# Patient Record
Sex: MALE | NOT HISPANIC OR LATINO | Employment: OTHER
[De-identification: names, ages, dates, MRNs, and addresses within clinical notes are randomized per-mention and may not be internally consistent; named-entity substitution may affect disease eponyms.]

---

## 2022-02-04 ENCOUNTER — APPOINTMENT (OUTPATIENT)
Dept: INTERNAL MEDICINE | Facility: OTHER | Age: 22
End: 2022-02-04
Payer: COMMERCIAL

## 2022-02-07 ENCOUNTER — OFFICE VISIT (OUTPATIENT)
Dept: INTERNAL MEDICINE | Facility: OTHER | Age: 22
End: 2022-02-07
Payer: COMMERCIAL

## 2022-02-07 VITALS
HEART RATE: 51 BPM | SYSTOLIC BLOOD PRESSURE: 106 MMHG | OXYGEN SATURATION: 95 % | DIASTOLIC BLOOD PRESSURE: 61 MMHG | BODY MASS INDEX: 34.2 KG/M2 | TEMPERATURE: 97.6 F | WEIGHT: 212.8 LBS | HEIGHT: 66 IN

## 2022-02-07 DIAGNOSIS — E78.5 DYSLIPIDEMIA: ICD-10-CM

## 2022-02-07 DIAGNOSIS — G47.00 INSOMNIA, UNSPECIFIED TYPE: ICD-10-CM

## 2022-02-07 DIAGNOSIS — Z83.438 FAMILY HISTORY OF HYPERLIPIDEMIA: ICD-10-CM

## 2022-02-07 DIAGNOSIS — F41.8 SITUATIONAL ANXIETY: ICD-10-CM

## 2022-02-07 DIAGNOSIS — F41.9 ANXIETY: ICD-10-CM

## 2022-02-07 DIAGNOSIS — Z83.3 FAMILY HISTORY OF DIABETES MELLITUS: ICD-10-CM

## 2022-02-07 DIAGNOSIS — Z91.89 AT RISK FOR SEXUALLY TRANSMITTED DISEASE DUE TO UNPROTECTED SEX: ICD-10-CM

## 2022-02-07 DIAGNOSIS — E66.09 CLASS 1 OBESITY DUE TO EXCESS CALORIES WITHOUT SERIOUS COMORBIDITY WITH BODY MASS INDEX (BMI) OF 34.0 TO 34.9 IN ADULT: ICD-10-CM

## 2022-02-07 PROBLEM — M25.561 ARTHRALGIA OF RIGHT KNEE: Status: ACTIVE | Noted: 2019-10-21

## 2022-02-07 PROBLEM — J30.2 SEASONAL ALLERGIES: Status: ACTIVE | Noted: 2019-10-21

## 2022-02-07 PROCEDURE — 99204 OFFICE O/P NEW MOD 45 MIN: CPT | Performed by: INTERNAL MEDICINE

## 2022-02-07 ASSESSMENT — PATIENT HEALTH QUESTIONNAIRE - PHQ9: CLINICAL INTERPRETATION OF PHQ2 SCORE: 0
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
## 2022-02-07 NOTE — PROGRESS NOTES
Previously seen at [facility] family medicine clinic at [facility] .last visit was in 03/2021.

## 2022-02-07 NOTE — PATIENT INSTRUCTIONS
Follow up in 3 months  Referral to psychology   Sleep hygiene   Labs ordered, please get it before next visit   Please consider COVID and FLU vaccination

## 2022-02-08 PROBLEM — F41.8 SITUATIONAL ANXIETY: Status: ACTIVE | Noted: 2022-02-08

## 2022-02-08 PROBLEM — Z91.89 AT RISK FOR SEXUALLY TRANSMITTED DISEASE DUE TO UNPROTECTED SEX: Status: ACTIVE | Noted: 2022-02-08

## 2022-02-08 PROBLEM — G47.00 INSOMNIA: Status: ACTIVE | Noted: 2022-02-08

## 2022-02-08 PROBLEM — F41.9 ANXIETY: Status: ACTIVE | Noted: 2022-02-08

## 2022-02-08 PROBLEM — Z11.3 SCREEN FOR STD (SEXUALLY TRANSMITTED DISEASE): Status: ACTIVE | Noted: 2022-02-08

## 2022-02-08 ASSESSMENT — ENCOUNTER SYMPTOMS
NECK PAIN: 0
NAUSEA: 0
PALPITATIONS: 0
BLURRED VISION: 0
COUGH: 0
LOSS OF CONSCIOUSNESS: 0
BACK PAIN: 0
MYALGIAS: 0
WEIGHT LOSS: 0
DEPRESSION: 0
SHORTNESS OF BREATH: 0
SORE THROAT: 0
HALLUCINATIONS: 0
CHILLS: 0
SPUTUM PRODUCTION: 0
DIZZINESS: 0
VOMITING: 0
INSOMNIA: 1
FEVER: 0
ABDOMINAL PAIN: 0
WEAKNESS: 0
HEARTBURN: 0
HEADACHES: 0

## 2022-02-08 ASSESSMENT — LIFESTYLE VARIABLES: SUBSTANCE_ABUSE: 0

## 2022-02-08 NOTE — ASSESSMENT & PLAN NOTE
In relationship - female partner. Does not use condom for protection  Partner diagnosed with possible chlamydia  Plan:  Will get STD screening

## 2022-02-08 NOTE — ASSESSMENT & PLAN NOTE
BMI of 34.3 kg/m2  Started exercising recently and has been regularly but unhealthy diet   Plan:  counseling provided   Discussed all the future complications with obesity including HTN,diabetes,JOHANN,cardiac diseases  Discussed importance of healthy diet and exercise for weight loss  Goal of at least 5 % weight loss in 3 months  metabolic panel and other labs ordered

## 2022-02-08 NOTE — ASSESSMENT & PLAN NOTE
Insomnia present for a year   Most likely multifactorial including unhealthy sleep habits , stress ( to support family as the elder son) and concern for PTSD/anxiety as one of the etiology   Plan:  Counseling provided   Educated about sleep hygiene, relaxation techniques,meditation.  Referral to psychology

## 2022-02-08 NOTE — ASSESSMENT & PLAN NOTE
As per patient h/o dyslipidemia in past but not sure.  No labs with us. Family h/o high cholesterol and heart diseases  Plan:  Lipid panel   counseling provided for weight loss, healthy diet and exercise
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
## 2022-02-16 ENCOUNTER — APPOINTMENT (OUTPATIENT)
Dept: LAB | Facility: MEDICAL CENTER | Age: 22
End: 2022-02-16
Payer: COMMERCIAL

## 2022-02-28 DIAGNOSIS — Z83.438 FAMILY HISTORY OF HYPERLIPIDEMIA: ICD-10-CM

## 2022-02-28 DIAGNOSIS — Z83.3 FAMILY HISTORY OF DIABETES MELLITUS: ICD-10-CM

## 2022-02-28 DIAGNOSIS — Z91.89 AT RISK FOR SEXUALLY TRANSMITTED DISEASE DUE TO UNPROTECTED SEX: ICD-10-CM

## 2022-02-28 DIAGNOSIS — F41.9 ANXIETY: ICD-10-CM

## 2022-03-10 ENCOUNTER — TELEPHONE (OUTPATIENT)
Dept: INTERNAL MEDICINE | Facility: OTHER | Age: 22
End: 2022-03-10
Payer: COMMERCIAL

## 2022-03-10 NOTE — TELEPHONE ENCOUNTER
Pt requesting a call , had lab work done in Feb and still ha not heard anything , was told told by another provider not in this office that labs were in his chart but unable to give results

## 2022-03-10 NOTE — TELEPHONE ENCOUNTER
I called the patient and informed the lab results.      Total cholesterol 243  LDL - 174    HDL 33  A1c= 5.1  TSH- normal   Normal electrolytes   Normal renal and liver function    STD panel      HIV -neg  HEpB- negative   RPR- negative   GC-Chlamydia- negative     Patient reports aware that he has high cholesterol at age 12-13 years when he was overweight. Family history of diabetes hyperlipidemia and CAD present.    Discussed about low fat and low calorie diet   regular exercise for weight loss  5-10% weight loss in 3-6 months

## 2022-04-27 ENCOUNTER — OFFICE VISIT (OUTPATIENT)
Dept: INTERNAL MEDICINE | Facility: OTHER | Age: 22
End: 2022-04-27
Payer: COMMERCIAL

## 2022-04-27 VITALS
HEIGHT: 66 IN | HEART RATE: 64 BPM | SYSTOLIC BLOOD PRESSURE: 123 MMHG | DIASTOLIC BLOOD PRESSURE: 63 MMHG | OXYGEN SATURATION: 96 % | BODY MASS INDEX: 35.97 KG/M2 | WEIGHT: 223.8 LBS | TEMPERATURE: 98.3 F

## 2022-04-27 DIAGNOSIS — L98.9 SKIN LESION: ICD-10-CM

## 2022-04-27 PROCEDURE — 99213 OFFICE O/P EST LOW 20 MIN: CPT | Mod: GE | Performed by: STUDENT IN AN ORGANIZED HEALTH CARE EDUCATION/TRAINING PROGRAM

## 2022-04-27 NOTE — PATIENT INSTRUCTIONS
Always do protected sexual intercourse.   Avoid shaving for a few weeks. If lesion worsens follow up in our clinic.   Follow up in 6 months         Folliculitis    Folliculitis is inflammation of the hair follicles. Folliculitis most commonly occurs on the scalp, thighs, legs, back, and buttocks. However, it can occur anywhere on the body.  What are the causes?  This condition may be caused by:  · A bacterial infection (common).  · A fungal infection.  · A viral infection.  · Contact with certain chemicals, especially oils and tars.  · Shaving or waxing.  · Greasy ointments or creams applied to the skin.  Long-lasting folliculitis and folliculitis that keeps coming back may be caused by bacteria. This bacteria can live anywhere on your skin and is often found in the nostrils.  What increases the risk?  You are more likely to develop this condition if you have:  · A weakened immune system.  · Diabetes.  · Obesity.  What are the signs or symptoms?  Symptoms of this condition include:  · Redness.  · Soreness.  · Swelling.  · Itching.  · Small white or yellow, pus-filled, itchy spots (pustules) that appear over a reddened area. If there is an infection that goes deep into the follicle, these may develop into a boil (furuncle).  · A group of closely packed boils (carbuncle). These tend to form in hairy, sweaty areas of the body.  How is this diagnosed?  This condition is diagnosed with a skin exam. To find what is causing the condition, your health care provider may take a sample of one of the pustules or boils for testing in a lab.  How is this treated?  This condition may be treated by:  · Applying warm compresses to the affected areas.  · Taking an antibiotic medicine or applying an antibiotic medicine to the skin.  · Applying or bathing with an antiseptic solution.  · Taking an over-the-counter medicine to help with itching.  · Having a procedure to drain any pustules or boils. This may be done if a pustule or boil  contains a lot of pus or fluid.  · Having laser hair removal. This may be done to treat long-lasting folliculitis.  Follow these instructions at home:  Managing pain and swelling    · If directed, apply heat to the affected area as often as told by your health care provider. Use the heat source that your health care provider recommends, such as a moist heat pack or a heating pad.  ? Place a towel between your skin and the heat source.  ? Leave the heat on for 20-30 minutes.  ? Remove the heat if your skin turns bright red. This is especially important if you are unable to feel pain, heat, or cold. You may have a greater risk of getting burned.  General instructions  · If you were prescribed an antibiotic medicine, take it or apply it as told by your health care provider. Do not stop using the antibiotic even if your condition improves.  · Check the irritated area every day for signs of infection. Check for:  ? Redness, swelling, or pain.  ? Fluid or blood.  ? Warmth.  ? Pus or a bad smell.  · Do not shave irritated skin.  · Take over-the-counter and prescription medicines only as told by your health care provider.  · Keep all follow-up visits as told by your health care provider. This is important.  Get help right away if:  · You have more redness, swelling, or pain in the affected area.  · Red streaks are spreading from the affected area.  · You have a fever.  Summary  · Folliculitis is inflammation of the hair follicles. Folliculitis most commonly occurs on the scalp, thighs, legs, back, and buttocks.  · This condition may be treated by taking an antibiotic medicine or applying an antibiotic medicine to the skin, and applying or bathing with an antiseptic solution.  · If you were prescribed an antibiotic medicine, take it or apply it as told by your health care provider. Do not stop using the antibiotic even if your condition improves.  · Get help right away if you have new or worsening symptoms.  · Keep all  follow-up visits as told by your health care provider. This is important.  This information is not intended to replace advice given to you by your health care provider. Make sure you discuss any questions you have with your health care provider.  Document Released: 02/26/2003 Document Revised: 07/27/2019 Document Reviewed: 07/27/2019  Elsevier Patient Education © 2020 [employer]

## 2022-04-30 PROBLEM — L98.9 SKIN LESION: Status: ACTIVE | Noted: 2022-04-30

## 2022-04-30 ASSESSMENT — ENCOUNTER SYMPTOMS
PHOTOPHOBIA: 0
EYE PAIN: 0
ORTHOPNEA: 0
FEVER: 0
CHILLS: 0
RESPIRATORY NEGATIVE: 1
MUSCULOSKELETAL NEGATIVE: 1
GASTROINTESTINAL NEGATIVE: 1
PALPITATIONS: 0
DOUBLE VISION: 0

## 2022-04-30 NOTE — PROGRESS NOTES
"      Established Patient    Chief Complaint   Patient presents with   • Genital Warts     Patient here for possible warts in genitials       HPI:   Jay Freitas is a 21 y.o. male who presented to the clinic for the following.    Skin lesion:   Painless skin lesion at the junction of penile shaft and plevis, 2 months, non erythematous, denies itching, drainage, discharge, bleeding, any systemic symptoms. Has been sexually active, monogamous, does not use barrier protection, was tested for STI's 1-2 months prior and negative. Denies any LUTS, testicular masses. Concerned if this is hernia due to heavy weight lifting at gym or something else.         Patient Active Problem List    Diagnosis Date Noted   • Situational anxiety 02/08/2022   • Screen for STD (sexually transmitted disease) 02/08/2022   • At risk for sexually transmitted disease due to unprotected sex 02/08/2022   • Insomnia 02/08/2022   • Seasonal allergies 10/21/2019   • Arthralgia of right knee 10/21/2019   • Dyslipidemia 12/21/2018   • Osgood-Schlatter's disease 07/20/2015   • Class 1 obesity in adult 07/20/2015       No current outpatient medications on file.     No current facility-administered medications for this visit.       ROS:   Review of Systems   Constitutional: Negative for chills and fever.   HENT: Negative for ear pain and tinnitus.    Eyes: Negative for double vision, photophobia and pain.   Respiratory: Negative.    Cardiovascular: Negative for palpitations and orthopnea.   Gastrointestinal: Negative.    Genitourinary: Negative.    Musculoskeletal: Negative.    Skin: Negative for rash.        /63 (BP Location: Left arm, Patient Position: Sitting, BP Cuff Size: Large adult)   Pulse 64   Temp 36.8 °C (98.3 °F) (Temporal)   Ht 1.676 m (5' 6\")   Wt 102 kg (223 lb 12.8 oz)   SpO2 96%   BMI 36.12 kg/m²     Physical Exam   Physical Exam  Vitals reviewed.   Constitutional:       Appearance: Normal appearance.   HENT:      Head: " Normocephalic.      Nose: Nose normal.      Mouth/Throat:      Mouth: Mucous membranes are moist.   Eyes:      Extraocular Movements: Extraocular movements intact.      Pupils: Pupils are equal, round, and reactive to light.   Cardiovascular:      Rate and Rhythm: Normal rate and regular rhythm.      Pulses: Normal pulses.   Pulmonary:      Effort: Pulmonary effort is normal.      Breath sounds: Normal breath sounds.   Abdominal:      Palpations: Abdomen is soft.   Genitourinary:     Penis: Normal.       Testes: Normal.       Musculoskeletal:      Cervical back: Normal range of motion.   Neurological:      Mental Status: He is alert.            Note: I have reviewed all pertinent labs and diagnostic tests associated with this visit with specific comments listed under the assessment and plan below    Assessment and Plan    1. Skin lesion  Does not appear to be infectious or actively inflamed. Has been shaving his genital lesion, suspect he ahd ingrown hair/foliculitis and skin changes afterwards.   Otherwise benign genital exam. Denies symptoms of infection. Recently was tested for STI's and neg.   Recommended barrier protection use.   Monitor symptoms, call office to schedule appt sooner if needed       Followup: Return in about 6 months (around 10/27/2022).    Patient discussed with attending.    Signed by: Tommy Richard M.D.    Please note that this dictation was created using voice recognition software. I have made every reasonable attempt to correct obvious errors, but I expect that there are errors of grammar and possibly content that I did not discover before finalizing the note.

## 2022-05-23 ENCOUNTER — OFFICE VISIT (OUTPATIENT)
Dept: INTERNAL MEDICINE | Facility: OTHER | Age: 22
End: 2022-05-23
Payer: COMMERCIAL

## 2022-05-23 VITALS
DIASTOLIC BLOOD PRESSURE: 64 MMHG | BODY MASS INDEX: 34.62 KG/M2 | TEMPERATURE: 97.7 F | HEIGHT: 67 IN | OXYGEN SATURATION: 97 % | HEART RATE: 67 BPM | SYSTOLIC BLOOD PRESSURE: 105 MMHG | WEIGHT: 220.6 LBS

## 2022-05-23 DIAGNOSIS — R41.840 ATTENTION DEFICIT: ICD-10-CM

## 2022-05-23 DIAGNOSIS — E78.5 DYSLIPIDEMIA: ICD-10-CM

## 2022-05-23 DIAGNOSIS — E66.09 CLASS 1 OBESITY DUE TO EXCESS CALORIES WITHOUT SERIOUS COMORBIDITY WITH BODY MASS INDEX (BMI) OF 34.0 TO 34.9 IN ADULT: ICD-10-CM

## 2022-05-23 PROBLEM — Z11.3 SCREEN FOR STD (SEXUALLY TRANSMITTED DISEASE): Status: RESOLVED | Noted: 2022-02-08 | Resolved: 2022-05-23

## 2022-05-23 PROCEDURE — 99213 OFFICE O/P EST LOW 20 MIN: CPT | Mod: GE | Performed by: STUDENT IN AN ORGANIZED HEALTH CARE EDUCATION/TRAINING PROGRAM

## 2022-05-23 NOTE — PATIENT INSTRUCTIONS
Please call behavior health to get an appointment   Continue healthy low fat diet and exercise and to loss weight at least 5 % in 3 months
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
## 2022-05-24 PROBLEM — L98.9 SKIN LESION: Status: RESOLVED | Noted: 2022-04-30 | Resolved: 2022-05-24

## 2022-05-24 PROBLEM — R41.840 ATTENTION DEFICIT: Status: ACTIVE | Noted: 2022-05-24

## 2022-05-24 PROBLEM — Z91.89 AT RISK FOR SEXUALLY TRANSMITTED DISEASE DUE TO UNPROTECTED SEX: Status: RESOLVED | Noted: 2022-02-08 | Resolved: 2022-05-24

## 2022-05-24 NOTE — ASSESSMENT & PLAN NOTE
Concern for possible attention deficit disorder which was unaddressed childhood.  Patient reports this is affecting his education and daily life.  Requesting for medications.  Plan  Counseling and education provided  Patient is recommended to get a formal evaluation done by behavioral health. I had provided referral to psychiatry during his last visit but patient lost the information so never made an appointment, he agrees to make an appointment.

## 2022-05-24 NOTE — ASSESSMENT & PLAN NOTE
BMI of 34.3 kg/m2  Started exercising recently and has been regularly but unhealthy diet   Plan:  counseling provided   Discussed all the future complications with obesity including HTN,diabetes,JOHANN,cardiac diseases  Discussed importance of healthy diet and exercise for weight loss. Discussed about low fat diet .  Goal of at least 5-10%  weight loss in 3 months

## 2022-05-24 NOTE — ASSESSMENT & PLAN NOTE
Elevated total cholesterol and LDL.  Family h/o high cholesterol and heart diseases  Plan:  counseling provided for weight loss, healthyLow-fat diet and exercise